# Patient Record
Sex: FEMALE | Race: WHITE
[De-identification: names, ages, dates, MRNs, and addresses within clinical notes are randomized per-mention and may not be internally consistent; named-entity substitution may affect disease eponyms.]

---

## 2018-06-04 ENCOUNTER — HOSPITAL ENCOUNTER (EMERGENCY)
Dept: HOSPITAL 57 - BURERS | Age: 48
Discharge: HOME | End: 2018-06-04
Payer: MEDICARE

## 2018-06-04 DIAGNOSIS — E66.9: ICD-10-CM

## 2018-06-04 DIAGNOSIS — K52.9: Primary | ICD-10-CM

## 2018-06-04 DIAGNOSIS — F32.9: ICD-10-CM

## 2018-06-04 DIAGNOSIS — Z79.899: ICD-10-CM

## 2018-06-04 LAB
ALBUMIN SERPL BCG-MCNC: 4 G/DL (ref 3.5–5)
ALP SERPL-CCNC: 92 U/L (ref 40–150)
ALT SERPL W P-5'-P-CCNC: 18 U/L (ref 8–55)
ANION GAP SERPL CALC-SCNC: 13 MMOL/L (ref 10–20)
AST SERPL-CCNC: 17 U/L (ref 5–34)
BILIRUB SERPL-MCNC: 0.6 MG/DL (ref 0.2–1.2)
BUN SERPL-MCNC: 13 MG/DL (ref 7–18.7)
CALCIUM SERPL-MCNC: 9.4 MG/DL (ref 7.8–10.44)
CHLORIDE SERPL-SCNC: 104 MMOL/L (ref 98–107)
CK MB SERPL-MCNC: 1.1 NG/ML (ref 0–6.6)
CO2 SERPL-SCNC: 28 MMOL/L (ref 22–29)
CREAT CL PREDICTED SERPL C-G-VRATE: 0 ML/MIN (ref 70–130)
GLOBULIN SER CALC-MCNC: 2.5 G/DL (ref 2.4–3.5)
GLUCOSE SERPL-MCNC: 92 MG/DL (ref 70–105)
HGB BLD-MCNC: 13.2 G/DL (ref 12–16)
LIPASE SERPL-CCNC: 34 U/L (ref 8–78)
MCH RBC QN AUTO: 27 PG (ref 27–31)
MCV RBC AUTO: 81.6 FL (ref 81–99)
MDIFF COMPLETE?: YES
PLATELET # BLD AUTO: 212 THOU/UL (ref 130–400)
POTASSIUM SERPL-SCNC: 4.2 MMOL/L (ref 3.5–5.1)
RBC # BLD AUTO: 4.89 MILL/UL (ref 4.2–5.4)
SODIUM SERPL-SCNC: 141 MMOL/L (ref 136–145)
SP GR UR STRIP: 1.01 (ref 1–1.03)
TROPONIN I SERPL DL<=0.01 NG/ML-MCNC: (no result) NG/ML (ref ?–0.03)
WBC # BLD AUTO: 5.9 THOU/UL (ref 4.8–10.8)

## 2018-06-04 PROCEDURE — 80053 COMPREHEN METABOLIC PANEL: CPT

## 2018-06-04 PROCEDURE — 96361 HYDRATE IV INFUSION ADD-ON: CPT

## 2018-06-04 PROCEDURE — 84484 ASSAY OF TROPONIN QUANT: CPT

## 2018-06-04 PROCEDURE — 96365 THER/PROPH/DIAG IV INF INIT: CPT

## 2018-06-04 PROCEDURE — 74177 CT ABD & PELVIS W/CONTRAST: CPT

## 2018-06-04 PROCEDURE — 94760 N-INVAS EAR/PLS OXIMETRY 1: CPT

## 2018-06-04 PROCEDURE — 85025 COMPLETE CBC W/AUTO DIFF WBC: CPT

## 2018-06-04 PROCEDURE — 93005 ELECTROCARDIOGRAM TRACING: CPT

## 2018-06-04 PROCEDURE — 83690 ASSAY OF LIPASE: CPT

## 2018-06-04 PROCEDURE — 82553 CREATINE MB FRACTION: CPT

## 2018-06-04 PROCEDURE — 81003 URINALYSIS AUTO W/O SCOPE: CPT

## 2018-06-04 PROCEDURE — 96375 TX/PRO/DX INJ NEW DRUG ADDON: CPT

## 2018-06-04 PROCEDURE — 36415 COLL VENOUS BLD VENIPUNCTURE: CPT

## 2018-06-04 NOTE — CT
CT ABDOMEN AND PELVIS WITH CONTRAST:

 

DATE: 6/4/18.

 

FINDINGS: 

Spiral CT of the abdomen and pelvis was done.  Oral contrast was withheld by request.  IV contrast wa
s started, but it was stopped immediately as the patient complained of pain at the injection site.  A
ppropriate measures were taken afterwards.  Thus, the exam is most likely a noncontrast study.  Axial
 slices were acquired, then coronal reconstructions were done.

 

The lung bases are clear.  The liver, spleen, pancreas, adrenal glands, and abdominal aorta showed no
 acute findings.  A nonobstructing calculus or were suggested in the right kidney.  There is no sign 
of hydronephrosis or ureteral calculi.  

 

There appears to have been a prior cholecystectomy as well as gastric reduction surgery.  There is so
me questioning of some mild thickening of the wall of the right colon.  There is no stranding around 
it, but I cannot exclude some mild colitis.  Nonspecific, nondilated loops of small bowel are seen wi
th fluid within them.  There is no sign of free air, free fluid, or inflammatory changes around any b
owel loops.

 

CT of the pelvis was partially obscured by artifact from the patient's artificial hips.  No gross mas
s, fluid collection, or inflammatory change was seen.  The patient has had prior laminectomies in the
 lower lumbar spine.  

 

IMPRESSION: 

1.  Equivocal thickening of the wall of the right colon.  Mild colitis is not excluded.

 

2.  Nonspecific fluid-filled loops of small bowel, no dilation.

 

3.  Nonobstructing right renal calculus.

 

POS: HOME

## 2018-06-08 ENCOUNTER — HOSPITAL ENCOUNTER (EMERGENCY)
Dept: HOSPITAL 9 - MADERS | Age: 48
Discharge: HOME | End: 2018-06-08
Payer: MEDICARE
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
## 2018-06-08 DIAGNOSIS — Z79.899: ICD-10-CM

## 2018-06-08 DIAGNOSIS — S70.01XA: Primary | ICD-10-CM

## 2018-06-08 DIAGNOSIS — M25.511: ICD-10-CM

## 2018-06-08 DIAGNOSIS — E66.9: ICD-10-CM

## 2018-06-08 DIAGNOSIS — F32.9: ICD-10-CM
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
